# Patient Record
Sex: MALE | Race: BLACK OR AFRICAN AMERICAN | NOT HISPANIC OR LATINO | Employment: FULL TIME | URBAN - METROPOLITAN AREA
[De-identification: names, ages, dates, MRNs, and addresses within clinical notes are randomized per-mention and may not be internally consistent; named-entity substitution may affect disease eponyms.]

---

## 2024-04-23 ENCOUNTER — OFFICE VISIT (OUTPATIENT)
Dept: URGENT CARE | Facility: CLINIC | Age: 24
End: 2024-04-23
Payer: COMMERCIAL

## 2024-04-23 ENCOUNTER — HOSPITAL ENCOUNTER (OUTPATIENT)
Facility: MEDICAL CENTER | Age: 24
End: 2024-04-23
Attending: NURSE PRACTITIONER
Payer: COMMERCIAL

## 2024-04-23 VITALS
WEIGHT: 165 LBS | TEMPERATURE: 99.2 F | SYSTOLIC BLOOD PRESSURE: 130 MMHG | RESPIRATION RATE: 16 BRPM | BODY MASS INDEX: 23.62 KG/M2 | HEIGHT: 70 IN | DIASTOLIC BLOOD PRESSURE: 70 MMHG | HEART RATE: 60 BPM | OXYGEN SATURATION: 97 %

## 2024-04-23 DIAGNOSIS — N50.89 TESTICLE LUMP: ICD-10-CM

## 2024-04-23 LAB
APPEARANCE UR: CLEAR
BILIRUB UR STRIP-MCNC: NEGATIVE MG/DL
COLOR UR AUTO: YELLOW
GLUCOSE UR STRIP.AUTO-MCNC: NEGATIVE MG/DL
KETONES UR STRIP.AUTO-MCNC: NEGATIVE MG/DL
LEUKOCYTE ESTERASE UR QL STRIP.AUTO: NEGATIVE
NITRITE UR QL STRIP.AUTO: NEGATIVE
PH UR STRIP.AUTO: 8 [PH] (ref 5–8)
PROT UR QL STRIP: NORMAL MG/DL
RBC UR QL AUTO: NEGATIVE
SP GR UR STRIP.AUTO: 1.02
UROBILINOGEN UR STRIP-MCNC: 1 MG/DL

## 2024-04-23 PROCEDURE — 87591 N.GONORRHOEAE DNA AMP PROB: CPT

## 2024-04-23 PROCEDURE — 87491 CHLMYD TRACH DNA AMP PROBE: CPT

## 2024-04-23 PROCEDURE — 3075F SYST BP GE 130 - 139MM HG: CPT | Performed by: NURSE PRACTITIONER

## 2024-04-23 PROCEDURE — 99203 OFFICE O/P NEW LOW 30 MIN: CPT | Performed by: NURSE PRACTITIONER

## 2024-04-23 PROCEDURE — 3078F DIAST BP <80 MM HG: CPT | Performed by: NURSE PRACTITIONER

## 2024-04-23 PROCEDURE — 81002 URINALYSIS NONAUTO W/O SCOPE: CPT | Performed by: NURSE PRACTITIONER

## 2024-04-24 ENCOUNTER — HOSPITAL ENCOUNTER (OUTPATIENT)
Dept: RADIOLOGY | Facility: MEDICAL CENTER | Age: 24
End: 2024-04-24
Attending: NURSE PRACTITIONER
Payer: COMMERCIAL

## 2024-04-24 ENCOUNTER — TELEPHONE (OUTPATIENT)
Dept: URGENT CARE | Facility: PHYSICIAN GROUP | Age: 24
End: 2024-04-24
Payer: COMMERCIAL

## 2024-04-24 DIAGNOSIS — N50.89 TESTICLE LUMP: ICD-10-CM

## 2024-04-24 LAB
C TRACH DNA SPEC QL NAA+PROBE: NEGATIVE
N GONORRHOEA DNA SPEC QL NAA+PROBE: NEGATIVE
SPECIMEN SOURCE: NORMAL

## 2024-04-24 PROCEDURE — 76870 US EXAM SCROTUM: CPT

## 2024-04-24 NOTE — PROGRESS NOTES
Date: 04/23/24        Chief Complaint   Patient presents with    Groin Pain     Lump on groin, started a week ago         History of Present Illness: 23 y.o.  male presents to clinic with a lump that he palpated on his right testicle about 1 week ago.  Patient states that it is only tender if he is touching it.  He does have a history of new sexual contact.  He denies any burning with urination urinary frequency urgency.  He denies any rashes or lesions.  No fever body aches.  No pelvic pain or flank pain.      ROS:    No severe shortness of breath   No Cardiac like chest pain, as discussed   As otherwise stated in HPI    Medical/SX/ Social History:  Reviewed per chart    Pertinent Medications:    No current outpatient medications on file prior to visit.     No current facility-administered medications on file prior to visit.        Allergies:    Patient has no known allergies.     Problem list, medications, and allergies reviewed by myself today in Epic     Physical Exam:    Vitals:    04/23/24 1838   BP: 130/70   Pulse: 60   Resp: 16   Temp: 37.3 °C (99.2 °F)   SpO2: 97%             Physical Exam  Constitutional:       Appearance: Normal appearance.   HENT:      Head: Normocephalic and atraumatic.   Genitourinary:     Testes:         Right: Varicocele present.          Comments: Varicocele vs epididymitis.   Neurological:      Mental Status: He is alert.                  Diagnostics:      Results for orders placed or performed in visit on 04/23/24   POCT Urinalysis   Result Value Ref Range    POC Color yellow Negative    POC Appearance clear Negative    POC Glucose negative Negative mg/dL    POC Bilirubin negative Negative mg/dL    POC Ketones negative Negative mg/dL    POC Specific Gravity 1.020 <1.005 - >1.030    POC Blood negative Negative    POC Urine PH 8.0 5.0 - 8.0    POC Protein Trace Negative mg/dL    POC Urobiligen 1.0 Negative (0.2) mg/dL    POC Nitrites negative Negative    POC Leukocyte Esterase  negative Negative      G and c pending      [id number]    Result Date: 4/24/2024 4/24/2024 6:09 PM HISTORY/REASON FOR EXAM: Swelling. TECHNIQUE/EXAM DESCRIPTION: Real-time sonography of the scrotum was performed with gray-scale, color and duplex Doppler imaging. COMPARISON: None FINDINGS: The right testis measures 4.3 x 1.6 x 1.8 cm. Normal in size and echotexture. Normal vascularity on color Doppler. No intratesticular mass. The left testis measures 4.1 x 2.7 x 2.2 cm. Normal in size and echotexture. Normal vascularity on color Doppler. No intratesticular mass. Vascular flow is symmetric. Appearance of the epididymides are within normal limits. No abnormal volume hydrocele is detected. There are bilateral varicocele.     1.  Normal sonographic appearance of both testicle 2.  Bilateral varicocele      Medical Decision making and plan :  I personally reviewed prior external notes and test results pertinent to today's visit. Pt is clinically stable at today's acute urgent care visit.  Patient appears nontoxic with no acute distress noted. Appropriate for outpatient care at this time.      Pleasant 23 y.o. male presented clinic with concerns for a lump on his right testicle.  Due to new sexual partners we did obtain a gonorrhea chlamydia urinalysis negative.  Discussed with patient that examining's are consistent with varicella although ultrasound indicated.  Ultrasound did reveal bilateral varicocele.  Discussed with patient this is a benign finding.  Patient did verbalize understanding and agree with plan    1. Testicle lump    - Chlamydia/GC, PCR (Urine); Future  - POCT Urinalysis  - [id number]; Future         Shared decision-making was utilized with patient for treatment plan. Medication discussed included indication for use and the potential benefits and side effects. Education was provided regarding the aforementioned assessments.  Differential Diagnosis, natural history, and supportive care  discussed. All of the patient's questions were answered to their satisfaction at the time of discharge. Patient was encouraged to monitor symptoms closely. Those signs and symptoms which would warrant concern and mandate seeking a higher level of service through the emergency department discussed at length.  Patient stated agreement and understanding of this plan of care.    Disposition:  Home in stable condition       Voice Recognition Disclaimer:  Portions of this document were created using voice recognition software. The software does have a chance of producing errors of grammar and possibly content. I have made every reasonable attempt to correct obvious errors, but there may be errors of grammar and possibly content that I did not discover before finalizing the documentation.    LUIS ALFREDO Genao.

## 2024-04-25 ENCOUNTER — TELEPHONE (OUTPATIENT)
Dept: URGENT CARE | Facility: PHYSICIAN GROUP | Age: 24
End: 2024-04-25
Payer: COMMERCIAL